# Patient Record
Sex: MALE | Race: WHITE | NOT HISPANIC OR LATINO | Employment: OTHER | URBAN - METROPOLITAN AREA
[De-identification: names, ages, dates, MRNs, and addresses within clinical notes are randomized per-mention and may not be internally consistent; named-entity substitution may affect disease eponyms.]

---

## 2020-12-07 NOTE — PATIENT DISCUSSION
The patient was informed that with [address] [facility] for distance, they will need glasses for all near and intermediate activities after surgery. The patient understands there is a possibility they may need an enhancement after surgery. The patient elects [employer] [facility], goal of emmetropia.

## 2021-01-05 NOTE — PATIENT DISCUSSION
The patient was informed that with [address] [facility] for distance, they will need glasses for all near and intermediate activities after surgery. The patient understands there is a possibility they may need an enhancement after surgery. The patient elects [employer] [facility], goal of emmetropia.

## 2021-01-05 NOTE — PATIENT DISCUSSION
The patient was informed that with [address] [facility] for distance, they will need glasses for all near and intermediate activities after surgery. The patient understands there is a possibility they may need an enhancement after surgery. The patient elects [employer] [facility], goal of emmetropia.

## 2021-01-05 NOTE — PATIENT DISCUSSION
The patient was informed that with [address] [facility] for distance, they will need glasses for all near and intermediate activities after surgery. The patient understands there is a possibility they may need an enhancement after surgery. The patient elects [employer] [facility], goal of emmetropia.

## 2021-01-12 NOTE — PATIENT DISCUSSION
Patient advised of the right to post-operative care by the surgeon. Patient is fully informed of, and agreed to, co-management with their primary optometric physician. Post-operative care by the surgeon is not medically necessary and co-management is clinically appropriate. Patient has received itemization of fees related to cataract surgery. Transfer of care letter completed for the patient. Transfer care of the left eye to Dr. Sarah Childs on 01-. Patient instructed to call immediately if any new distortion, blurring, decreased vision or eye pain.

## 2021-01-12 NOTE — PATIENT DISCUSSION
Cataract surgery has been performed in the first eye and activities of daily living are still impaired. The patient would like to proceed with cataract surgery in the second eye as scheduled. The patient elects [facility], goal of -2.00.  may need NVO/DVO for prn use.

## 2021-06-07 ENCOUNTER — CATARACT EVALUATION (OUTPATIENT)
Dept: URBAN - METROPOLITAN AREA CLINIC 43 | Facility: CLINIC | Age: 66
End: 2021-06-07

## 2021-06-07 DIAGNOSIS — H25.811: ICD-10-CM

## 2021-06-07 DIAGNOSIS — H43.813: ICD-10-CM

## 2021-06-07 DIAGNOSIS — H35.371: ICD-10-CM

## 2021-06-07 DIAGNOSIS — H18.513: ICD-10-CM

## 2021-06-07 DIAGNOSIS — H25.812: ICD-10-CM

## 2021-06-07 PROCEDURE — 92134 CPTRZ OPH DX IMG PST SGM RTA: CPT

## 2021-06-07 PROCEDURE — 92025-3 CORNEAL TOPO, REFUSED

## 2021-06-07 PROCEDURE — 92136TC INTERFEROMETRY - TECHNICAL COMPONENT

## 2021-06-07 PROCEDURE — 92250 FUNDUS PHOTOGRAPHY W/I&R: CPT

## 2021-06-07 PROCEDURE — 92286 ANT SGM IMG I&R SPECLR MIC: CPT

## 2021-06-07 PROCEDURE — 92014 COMPRE OPH EXAM EST PT 1/>: CPT

## 2021-06-07 PROCEDURE — V2799PMN IMPRIMIS PRED-MOXI-NEPAF 5ML

## 2021-06-07 ASSESSMENT — TONOMETRY
OD_IOP_MMHG: 16
OS_IOP_MMHG: 16

## 2021-06-07 ASSESSMENT — VISUAL ACUITY
OS_BAT: 20/80
OS_CC: 20/30-2
OD_CC: 20/200
OD_BAT: 20/400
OS_CC: J3
OD_CC: J4

## 2021-06-09 ENCOUNTER — SURGERY/PROCEDURE (OUTPATIENT)
Dept: URBAN - METROPOLITAN AREA CLINIC 43 | Facility: CLINIC | Age: 66
End: 2021-06-09

## 2021-06-09 ENCOUNTER — TECH ONLY (OUTPATIENT)
Dept: URBAN - METROPOLITAN AREA CLINIC 39 | Facility: CLINIC | Age: 66
End: 2021-06-09

## 2021-06-09 ENCOUNTER — ESTABLISHED PATIENT (OUTPATIENT)
Dept: URBAN - METROPOLITAN AREA SURGERY 14 | Facility: SURGERY | Age: 66
End: 2021-06-09

## 2021-06-09 VITALS
RESPIRATION RATE: 15 BRPM | SYSTOLIC BLOOD PRESSURE: 144 MMHG | HEIGHT: 60 IN | DIASTOLIC BLOOD PRESSURE: 88 MMHG | HEART RATE: 78 BPM

## 2021-06-09 DIAGNOSIS — Z96.1: ICD-10-CM

## 2021-06-09 DIAGNOSIS — H25.811: ICD-10-CM

## 2021-06-09 DIAGNOSIS — H25.812: ICD-10-CM

## 2021-06-09 PROCEDURE — 66984 XCAPSL CTRC RMVL W/O ECP: CPT

## 2021-06-09 PROCEDURE — 99211T TECH SERVICE

## 2021-06-09 PROCEDURE — 99211HP H&P OFFICE/OUTPATIENT VISIT, EST

## 2021-06-09 ASSESSMENT — TONOMETRY: OD_IOP_MMHG: 12

## 2021-06-09 ASSESSMENT — VISUAL ACUITY: OD_SC: 20/80

## 2021-08-04 ENCOUNTER — POST OP/EVAL OF SECOND EYE (OUTPATIENT)
Dept: URBAN - METROPOLITAN AREA CLINIC 39 | Facility: CLINIC | Age: 66
End: 2021-08-04

## 2021-08-04 ENCOUNTER — TECH ONLY (OUTPATIENT)
Dept: URBAN - METROPOLITAN AREA CLINIC 39 | Facility: CLINIC | Age: 66
End: 2021-08-04

## 2021-08-04 ENCOUNTER — SURGERY/PROCEDURE (OUTPATIENT)
Dept: URBAN - METROPOLITAN AREA CLINIC 43 | Facility: CLINIC | Age: 66
End: 2021-08-04

## 2021-08-04 ENCOUNTER — ESTABLISHED PATIENT (OUTPATIENT)
Dept: URBAN - METROPOLITAN AREA SURGERY 14 | Facility: SURGERY | Age: 66
End: 2021-08-04

## 2021-08-04 DIAGNOSIS — Z96.1: ICD-10-CM

## 2021-08-04 DIAGNOSIS — H25.812: ICD-10-CM

## 2021-08-04 DIAGNOSIS — H57.03: ICD-10-CM

## 2021-08-04 PROCEDURE — 66982 XCAPSL CTRC RMVL CPLX WO ECP: CPT

## 2021-08-04 PROCEDURE — 99211HP H&P OFFICE/OUTPATIENT VISIT, EST

## 2021-08-04 PROCEDURE — 99211T TECH SERVICE

## 2021-08-04 PROCEDURE — 99213 OFFICE O/P EST LOW 20 MIN: CPT

## 2021-08-04 ASSESSMENT — VISUAL ACUITY
OS_SC: 20/400
OS_SC: 20/40
OD_SC: 20/30-2
OS_PH: 20/200+1

## 2021-08-04 ASSESSMENT — TONOMETRY
OD_IOP_MMHG: 15
OS_IOP_MMHG: 04
OS_IOP_MMHG: 16

## 2021-08-17 ENCOUNTER — POST-OP CATARACT (OUTPATIENT)
Dept: URBAN - METROPOLITAN AREA CLINIC 43 | Facility: CLINIC | Age: 66
End: 2021-08-17

## 2021-08-17 DIAGNOSIS — Z96.1: ICD-10-CM

## 2021-08-17 ASSESSMENT — VISUAL ACUITY
OD_SC: 20/30-1
OS_SC: 20/20-

## 2021-08-17 ASSESSMENT — TONOMETRY
OD_IOP_MMHG: 12
OS_IOP_MMHG: 12